# Patient Record
Sex: MALE | Race: WHITE | NOT HISPANIC OR LATINO | Employment: FULL TIME | URBAN - METROPOLITAN AREA
[De-identification: names, ages, dates, MRNs, and addresses within clinical notes are randomized per-mention and may not be internally consistent; named-entity substitution may affect disease eponyms.]

---

## 2018-02-09 ENCOUNTER — APPOINTMENT (OUTPATIENT)
Dept: GENERAL RADIOLOGY | Facility: HOSPITAL | Age: 23
End: 2018-02-09

## 2018-02-09 ENCOUNTER — HOSPITAL ENCOUNTER (EMERGENCY)
Facility: HOSPITAL | Age: 23
Discharge: HOME | End: 2018-02-09
Attending: EMERGENCY MEDICINE | Admitting: EMERGENCY MEDICINE

## 2018-02-09 VITALS
HEART RATE: 71 BPM | SYSTOLIC BLOOD PRESSURE: 135 MMHG | HEIGHT: 67 IN | TEMPERATURE: 98 F | RESPIRATION RATE: 20 BRPM | BODY MASS INDEX: 39.55 KG/M2 | OXYGEN SATURATION: 98 % | DIASTOLIC BLOOD PRESSURE: 76 MMHG | WEIGHT: 252 LBS

## 2018-02-09 DIAGNOSIS — S90.31XA CONTUSION OF RIGHT FOOT, INITIAL ENCOUNTER: Primary | ICD-10-CM

## 2018-02-09 PROCEDURE — 99284 EMERGENCY DEPT VISIT MOD MDM: CPT

## 2018-02-09 PROCEDURE — 73630 X-RAY EXAM OF FOOT: CPT

## 2018-02-09 RX ORDER — CETIRIZINE HYDROCHLORIDE 5 MG/1
5 TABLET ORAL DAILY
COMMUNITY

## 2018-02-09 RX ORDER — ALBUTEROL SULFATE 2.5 MG/3ML
2.5 SOLUTION RESPIRATORY (INHALATION) EVERY 4 HOURS PRN
COMMUNITY

## 2018-02-09 RX ORDER — FLUTICASONE PROPIONATE 50 MCG
1 SPRAY, SUSPENSION (ML) NASAL DAILY
COMMUNITY

## 2018-02-09 RX ORDER — MONTELUKAST SODIUM 10 MG/1
10 TABLET ORAL NIGHTLY
COMMUNITY

## 2018-02-09 RX ORDER — IBUPROFEN 800 MG/1
800 TABLET ORAL ONCE
Status: COMPLETED | OUTPATIENT
Start: 2018-02-09 | End: 2018-02-09

## 2018-02-09 RX ORDER — IBUPROFEN 800 MG/1
800 TABLET ORAL EVERY 6 HOURS PRN
Qty: 15 TABLET | Refills: 0 | Status: SHIPPED | OUTPATIENT
Start: 2018-02-09

## 2018-02-09 RX ADMIN — IBUPROFEN 800 MG: 800 TABLET ORAL at 02:00

## 2018-02-09 NOTE — ED PROVIDER NOTES
Subjective   HPI Comments: Pt is a 22 yr old male that presents emergency Department complaints of right foot pain.  Patient explains at 10:30 tonight he dropped a 200 pound door on his foot.  Patient reports she's not been able to bear weight since the injury.  Patient has range of motion of his toes.  Patient denies any numbness, tingling.    Patient is a 22 y.o. male presenting with lower extremity pain.   History provided by:  Patient  Lower Extremity Issue   Lower extremity pain location: Rt foot.  Pain details:     Quality:  Throbbing    Radiates to:  Does not radiate    Severity:  Moderate    Timing:  Constant    Progression:  Worsening  Relieved by:  Rest  Worsened by:  Bearing weight and activity  Ineffective treatments:  None tried  Associated symptoms: swelling    Associated symptoms: no decreased ROM, no numbness and no tingling        Review of Systems   Musculoskeletal:        Rt foot pain   All other systems reviewed and are negative.      Past Medical History:   Diagnosis Date   • Chronic headaches    • GERD (gastroesophageal reflux disease)    • IBS (irritable bowel syndrome)    • Seasonal allergies        Allergies   Allergen Reactions   • Concerta [Methylphenidate Hcl Er (Cd)] Other (See Comments)     MOOD SWINGS   • Morphine And Related Palpitations       Past Surgical History:   Procedure Laterality Date   • APPENDECTOMY     • TONSILLECTOMY         History reviewed. No pertinent family history.    Social History     Social History   • Marital status: Single     Spouse name: N/A   • Number of children: N/A   • Years of education: N/A     Social History Main Topics   • Smoking status: Never Smoker   • Smokeless tobacco: Never Used   • Alcohol use No   • Drug use: No   • Sexual activity: Defer     Other Topics Concern   • None     Social History Narrative   • None           Objective   Physical Exam   Constitutional: He is oriented to person, place, and time. He appears well-developed and  "well-nourished. No distress.   HENT:   Head: Normocephalic and atraumatic.   Eyes: EOM are normal.   Neck: Normal range of motion.   Cardiovascular: Normal rate, regular rhythm, normal heart sounds and intact distal pulses.    Pulmonary/Chest: Effort normal and breath sounds normal. No respiratory distress.        Neurological: He is alert and oriented to person, place, and time.   Skin: Skin is warm and dry.   Psychiatric: He has a normal mood and affect.   Nursing note and vitals reviewed.      Procedures         ED Course  ED Course   Comment By Time   0146  patient is advised results at this time.  Patient wanted a sharp applied to his extremity.  Patient be given crutches.  Patient to take Motrin.  Patient to rest, ice, compression and elevate extremity.  Patient agrees and verbalizes understanding. KENDAL Mullins 02/09 0513        No results found for this or any previous visit (from the past 24 hour(s)).  Note: In addition to lab results from this visit, the labs listed above may include labs taken at another facility or during a different encounter within the last 24 hours. Please correlate lab times with ED admission and discharge times for further clarification of the services performed during this visit.    XR Foot 3+ View Right   Final Result      No acute bone abnormality.      Soft tissue swelling as discussed above.      THIS DOCUMENT HAS BEEN ELECTRONICALLY SIGNED BY ELIO CONNER MD        Vitals:    02/09/18 0002   BP: 135/76   Pulse: 71   Resp: 20   Temp: 98 °F (36.7 °C)   TempSrc: Oral   SpO2: 98%   Weight: 114 kg (252 lb)   Height: 170.2 cm (67\")     Medications   ibuprofen (ADVIL,MOTRIN) tablet 800 mg (800 mg Oral Given 2/9/18 0200)     ECG/EMG Results (last 24 hours)     ** No results found for the last 24 hours. **                  [facility]    Final diagnoses:   Contusion of right foot, initial encounter            KENDAL Mullins  02/09/18 0555    "